# Patient Record
(demographics unavailable — no encounter records)

---

## 2024-10-10 NOTE — PHYSICAL EXAM
[No Acute Distress] : no acute distress [Well Nourished] : well nourished [No Deformities] : no deformities [Enlarged Base of the Tongue] : enlarged base of the tongue [IV] : Mallampati Class: IV [Normal Rate/Rhythm] : normal rate/rhythm [Normal S1, S2] : normal s1, s2 [No Murmurs] : no murmurs [No Resp Distress] : no resp distress [Clear to Auscultation Bilaterally] : clear to auscultation bilaterally [No Clubbing] : no clubbing [No Cyanosis] : no cyanosis [No Edema] : no edema [Normal Color/ Pigmentation] : normal color/ pigmentation [No Focal Deficits] : no focal deficits [Oriented x3] : oriented x3

## 2024-10-10 NOTE — HISTORY OF PRESENT ILLNESS
[Never] : never [TextBox_4] : This is a 50-year-old male with significant past medical history of childhood and adult asthma who comes in follow-up.    The patient reports that he had to use his asthma pump recently due to work-related stress and exposure to dust. He mentions that he had an emergency about four months ago and was given steroids in pill form for a week. Since then, he has been managing his symptoms by avoiding dust and covering himself when necessary. He states that he feels fine overall and has not had any other significant issues.  Of note: The patient indicates that he has been sent to the emergency room twice in the last couple of years for shortness of breath.  He usually is able to alleviate his shortness of breath with albuterol inhalers.  He indicates that his breathing gets significantly worse when he is exposed to pollen.  He has never had a formal pulmonary function test and was told in the emergency room and by another physician that he had asthma.  He is a lifelong non-smoker and he does not have any significant exposures.  However, he does work in construction and tries to avoid dust and paint that can exacerbate his breathing.  Recently over the last couple of days he has been feeling ill with a slight viral infection.  He has not taken any albuterol because he did not have any.  He is originally from [facility] and immigrated in [place].  He had COVID in 2021 but was not hospitalized.  He has declined the COVID-19 vaccines.

## 2024-10-10 NOTE — ASSESSMENT
[FreeTextEntry1] : This is a 50-year-old male with significant past medical history of asthma who comes in for follow-up.  #Asthma-patient indicates that he has longstanding asthma.  Patient has high levels of eosinophils (350), and normal IgE.  However the patient does have significant allergies to grass and other environmental factors.  Pulmonary function test is normal without bronchodilator response. He is currently not taking any medications.  - Prescribe Airsupra - Educate the patient on the use of the new inhaler for both relief and prevention  #Immunizations-patient currently declines Pneumovax and influenza vaccine.  Patient will follow up in 6-12 months or sooner if needed.

## 2024-10-10 NOTE — REVIEW OF SYSTEMS
[Cough] : cough [Sputum] : sputum [Wheezing] : wheezing [Seasonal Allergies] : seasonal allergies [Negative] : Endocrine